# Patient Record
Sex: MALE | Race: WHITE | NOT HISPANIC OR LATINO | URBAN - METROPOLITAN AREA
[De-identification: names, ages, dates, MRNs, and addresses within clinical notes are randomized per-mention and may not be internally consistent; named-entity substitution may affect disease eponyms.]

---

## 2022-08-31 ENCOUNTER — WEB ENCOUNTER (OUTPATIENT)
Dept: URBAN - METROPOLITAN AREA CLINIC 107 | Facility: CLINIC | Age: 52
End: 2022-08-31

## 2022-08-31 ENCOUNTER — TELEPHONE ENCOUNTER (OUTPATIENT)
Dept: URBAN - METROPOLITAN AREA CLINIC 113 | Facility: CLINIC | Age: 52
End: 2022-08-31

## 2022-08-31 ENCOUNTER — OFFICE VISIT (OUTPATIENT)
Dept: URBAN - METROPOLITAN AREA CLINIC 107 | Facility: CLINIC | Age: 52
End: 2022-08-31
Payer: MEDICARE

## 2022-08-31 VITALS
TEMPERATURE: 98.2 F | SYSTOLIC BLOOD PRESSURE: 146 MMHG | DIASTOLIC BLOOD PRESSURE: 102 MMHG | HEART RATE: 84 BPM | HEIGHT: 68 IN | BODY MASS INDEX: 41.83 KG/M2 | RESPIRATION RATE: 20 BRPM | WEIGHT: 276 LBS

## 2022-08-31 DIAGNOSIS — R10.84 GENERALIZED ABDOMINAL PAIN: ICD-10-CM

## 2022-08-31 PROCEDURE — 99204 OFFICE O/P NEW MOD 45 MIN: CPT | Performed by: INTERNAL MEDICINE

## 2022-08-31 RX ORDER — DULOXETINE 60 MG/1
1 CAPSULE CAPSULE, DELAYED RELEASE PELLETS ORAL ONCE A DAY
Status: ACTIVE | COMMUNITY

## 2022-08-31 RX ORDER — CELECOXIB 200 MG/1
1 CAPSULE WITH FOOD CAPSULE ORAL ONCE A DAY
Status: ACTIVE | COMMUNITY

## 2022-08-31 RX ORDER — TRAZODONE HYDROCHLORIDE 100 MG/1
1 TABLET AT BEDTIME TABLET ORAL ONCE A DAY
Status: ACTIVE | COMMUNITY

## 2022-08-31 RX ORDER — HYOSCYAMINE SULFATE 0.12 MG/1
1-2 TABLET TABLET SUBLINGUAL
Qty: 60 | Refills: 3 | OUTPATIENT
Start: 2022-08-31 | End: 2022-12-28

## 2022-08-31 RX ORDER — HYDROXYZINE HYDROCHLORIDE 25 MG/1
1 TABLET AT BEDTIME AS NEEDED TABLET, FILM COATED ORAL ONCE A DAY
Status: ACTIVE | COMMUNITY

## 2022-08-31 NOTE — HPI-TODAY'S VISIT:
Mr. Dugan is a 52-year-old gentleman with a history of PTSD and appendectomy presenting for evaluation of abdominal pain.  He reports for more than 1 year having intermittent episodes of abdominal pain that varies in location, described as a "twisting" pain.  The pain occurs daily, generally last anywhere from 2 to 30 minutes, then self resolves.   No radiation or obvious modifying factors.  He was seen by Dr. Phillips and underwent an appendectomy.  He has occasional nausea and nonbloody emesis with the abdominal pain.  His bowel habits are regular.  No NSAID use.  No fevers, chills, melena or red blood per rectum.  He has occasional heartburn and regurgitation at night, but no dysphagia.  No records are available for review, but he has undergone a CT abdomen pelvis before undergoing an appendectomy.  He had a colonoscopy in 2021 that was unremarkable.  He has never undergone an EGD.  He is adopted and does not know his parents medical history.  Labs rox for review from 7/28/2022 showed normal CBC, normal basic metabolic panel, normal liver function tests, TSH 1.78, mildly elevated lipase 92 (upper limit normal 60), vitamin D 25-hydroxy 21.7.

## 2022-10-17 ENCOUNTER — LAB OUTSIDE AN ENCOUNTER (OUTPATIENT)
Dept: URBAN - METROPOLITAN AREA CLINIC 107 | Facility: CLINIC | Age: 52
End: 2022-10-17

## 2022-10-17 ENCOUNTER — OFFICE VISIT (OUTPATIENT)
Dept: URBAN - METROPOLITAN AREA CLINIC 107 | Facility: CLINIC | Age: 52
End: 2022-10-17
Payer: MEDICARE

## 2022-10-17 VITALS
HEART RATE: 87 BPM | DIASTOLIC BLOOD PRESSURE: 95 MMHG | WEIGHT: 279 LBS | HEIGHT: 68 IN | RESPIRATION RATE: 20 BRPM | BODY MASS INDEX: 42.28 KG/M2 | TEMPERATURE: 98.2 F | SYSTOLIC BLOOD PRESSURE: 146 MMHG

## 2022-10-17 DIAGNOSIS — K21.9 GASTROESOPHAGEAL REFLUX DISEASE, UNSPECIFIED WHETHER ESOPHAGITIS PRESENT: ICD-10-CM

## 2022-10-17 DIAGNOSIS — R10.10 PAIN OF UPPER ABDOMEN: ICD-10-CM

## 2022-10-17 PROCEDURE — 99214 OFFICE O/P EST MOD 30 MIN: CPT | Performed by: INTERNAL MEDICINE

## 2022-10-17 RX ORDER — CELECOXIB 200 MG/1
1 CAPSULE WITH FOOD CAPSULE ORAL ONCE A DAY
Status: ACTIVE | COMMUNITY

## 2022-10-17 RX ORDER — TRAZODONE HYDROCHLORIDE 100 MG/1
1 TABLET AT BEDTIME TABLET ORAL ONCE A DAY
Status: ACTIVE | COMMUNITY

## 2022-10-17 RX ORDER — HYDROXYZINE HYDROCHLORIDE 25 MG/1
1 TABLET AT BEDTIME AS NEEDED TABLET, FILM COATED ORAL ONCE A DAY
Status: ACTIVE | COMMUNITY

## 2022-10-17 RX ORDER — HYOSCYAMINE SULFATE 0.12 MG/1
1-2 TABLET TABLET SUBLINGUAL
Qty: 60 | Refills: 3 | Status: ACTIVE | COMMUNITY
Start: 2022-08-31 | End: 2022-12-28

## 2022-10-17 RX ORDER — DULOXETINE 60 MG/1
1 CAPSULE CAPSULE, DELAYED RELEASE PELLETS ORAL ONCE A DAY
Status: ACTIVE | COMMUNITY

## 2022-10-17 NOTE — HPI-TODAY'S VISIT:
Mr. Dugan is a 52-year-old gentleman with a history of PTSD and appendectomy presenting for evaluation of abdominal pain.  He was last seen on 8/31/2022 for evaluation of generalized abdominal pain that wax and wane with no modifying factors.  Labs provided for review were unremarkable.  A CT abdomen pelvis with contrast was ordered, and he was provided hyoscyamine for relief of exacerbations of pain.  The CT abdomen pelvis with contrast on 9/22/2022 showed no acute findings.  He reports that the hyoscyamine has not been helpful for relieving pain.  He continues to have waxing and waning diffuse abdominal pain that is largely worse in the mid upper abdomen.  He denies any modifying factors such as diet or bowel movements.  He typically has the episodes of abdominal pain 1 to 2 days/week.  The episodes generally last anywhere from 2 to 30 minutes.  He reports that his bowel habits are occasionally somewhat loose consistency but overall fairly normal with no melena or red blood per rectum.  He denies any weight loss.  He has occasional episodes of heartburn that has not improved with a proton pump inhibitor.  He denies any dysphagia.  He reportedly had a colonoscopy by a local surgeon that was unremarkable.  He had a EGD more than 10 years ago for evaluation of GERD symptoms that was "okay".  The records of his EGD and colonoscopy are not available for review.

## 2022-10-21 ENCOUNTER — OFFICE VISIT (OUTPATIENT)
Dept: URBAN - METROPOLITAN AREA SURGERY CENTER 25 | Facility: SURGERY CENTER | Age: 52
End: 2022-10-21

## 2022-10-21 ENCOUNTER — CLAIMS CREATED FROM THE CLAIM WINDOW (OUTPATIENT)
Dept: URBAN - METROPOLITAN AREA SURGERY CENTER 25 | Facility: SURGERY CENTER | Age: 52
End: 2022-10-21
Payer: MEDICARE

## 2022-10-21 ENCOUNTER — CLAIMS CREATED FROM THE CLAIM WINDOW (OUTPATIENT)
Dept: URBAN - METROPOLITAN AREA CLINIC 4 | Facility: CLINIC | Age: 52
End: 2022-10-21
Payer: MEDICARE

## 2022-10-21 DIAGNOSIS — K22.89 OTHER SPECIFIED DISEASE OF ESOPHAGUS: ICD-10-CM

## 2022-10-21 DIAGNOSIS — K31.7 POLYP OF STOMACH FUNDIC GLAND: ICD-10-CM

## 2022-10-21 DIAGNOSIS — K31.89 OTHER DISEASES OF STOMACH AND DUODENUM: ICD-10-CM

## 2022-10-21 PROBLEM — 83132003: Status: ACTIVE | Noted: 2022-10-17

## 2022-10-21 PROCEDURE — 88312 SPECIAL STAINS GROUP 1: CPT | Performed by: PATHOLOGY

## 2022-10-21 PROCEDURE — 88305 TISSUE EXAM BY PATHOLOGIST: CPT | Performed by: PATHOLOGY

## 2022-10-21 PROCEDURE — 43239 EGD BIOPSY SINGLE/MULTIPLE: CPT | Performed by: INTERNAL MEDICINE

## 2022-10-21 PROCEDURE — G8907 PT DOC NO EVENTS ON DISCHARG: HCPCS | Performed by: INTERNAL MEDICINE

## 2022-10-21 RX ORDER — HYOSCYAMINE SULFATE 0.12 MG/1
1-2 TABLET TABLET SUBLINGUAL
Qty: 60 | Refills: 3 | Status: ACTIVE | COMMUNITY
Start: 2022-08-31 | End: 2022-12-28

## 2022-10-21 RX ORDER — DULOXETINE 60 MG/1
1 CAPSULE CAPSULE, DELAYED RELEASE PELLETS ORAL ONCE A DAY
Status: ACTIVE | COMMUNITY

## 2022-10-21 RX ORDER — CELECOXIB 200 MG/1
1 CAPSULE WITH FOOD CAPSULE ORAL ONCE A DAY
Status: ACTIVE | COMMUNITY

## 2022-10-21 RX ORDER — TRAZODONE HYDROCHLORIDE 100 MG/1
1 TABLET AT BEDTIME TABLET ORAL ONCE A DAY
Status: ACTIVE | COMMUNITY

## 2022-10-21 RX ORDER — HYDROXYZINE HYDROCHLORIDE 25 MG/1
1 TABLET AT BEDTIME AS NEEDED TABLET, FILM COATED ORAL ONCE A DAY
Status: ACTIVE | COMMUNITY

## 2022-11-14 PROBLEM — 92411005 BENIGN NEOPLASM OF STOMACH: Status: ACTIVE | Noted: 2022-11-14

## 2022-12-29 ENCOUNTER — OFFICE VISIT (OUTPATIENT)
Dept: URBAN - METROPOLITAN AREA CLINIC 113 | Facility: CLINIC | Age: 52
End: 2022-12-29
Payer: MEDICARE

## 2022-12-29 VITALS
BODY MASS INDEX: 42.13 KG/M2 | WEIGHT: 278 LBS | RESPIRATION RATE: 16 BRPM | DIASTOLIC BLOOD PRESSURE: 90 MMHG | SYSTOLIC BLOOD PRESSURE: 139 MMHG | TEMPERATURE: 97.8 F | HEART RATE: 88 BPM | HEIGHT: 68 IN

## 2022-12-29 DIAGNOSIS — K21.9 GERD: ICD-10-CM

## 2022-12-29 DIAGNOSIS — R10.84 GENERALIZED ABDOMINAL PAIN: ICD-10-CM

## 2022-12-29 DIAGNOSIS — R10.11 RIGHT UPPER QUADRANT ABDOMINAL PAIN: ICD-10-CM

## 2022-12-29 DIAGNOSIS — R14.0 ABDOMINAL BLOATING: ICD-10-CM

## 2022-12-29 DIAGNOSIS — R19.7 DIARRHEA: ICD-10-CM

## 2022-12-29 PROCEDURE — 99214 OFFICE O/P EST MOD 30 MIN: CPT | Performed by: NURSE PRACTITIONER

## 2022-12-29 RX ORDER — CELECOXIB 200 MG/1
1 CAPSULE WITH FOOD CAPSULE ORAL ONCE A DAY
Status: ACTIVE | COMMUNITY

## 2022-12-29 RX ORDER — HYDROXYZINE HYDROCHLORIDE 25 MG/1
1 TABLET AT BEDTIME AS NEEDED TABLET, FILM COATED ORAL ONCE A DAY
Status: ACTIVE | COMMUNITY

## 2022-12-29 RX ORDER — TRAZODONE HYDROCHLORIDE 100 MG/1
1 TABLET AT BEDTIME TABLET ORAL ONCE A DAY
Status: ACTIVE | COMMUNITY

## 2022-12-29 RX ORDER — DULOXETINE 60 MG/1
1 CAPSULE CAPSULE, DELAYED RELEASE PELLETS ORAL ONCE A DAY
Status: ACTIVE | COMMUNITY

## 2022-12-29 NOTE — HPI-TODAY'S VISIT:
Mr. Dugan is a 52-year-old gentleman with a history of PTSD and appendectomy presenting for follow-up of abdominal pain. He was last seen 10/17/22 for follow-up of unexplained upper abdominal pain, unresponsive to PPI. Previous labs and CT of the abdomen and pelvis with contrast were unremarkable. An upper endoscopy was planned, with consider for HIDA scan to evaluate for chronic cholecystitis pending clinical course. The EGD was performed on 10/21/22. Findings included an irregular z-line, nonerosive gastropathy, and multiple gastric polyps s/p biopsies which showed fundic gland polyps. Gastric biopsies were negative for H. pylori. His symptoms have worsened within the last week. He complains of diffuse abdominal pain, which is worsened on the right side. The pain is constant but worsened with positional changes such as bending over. The abdominal discomfort is not related to eating or bowel movements, but is cramping in nature. He complains of associated nausea with dry heaves but has not vomited. He complains of intermittent heartburn and a nighttime cough with regurgitation when he lies down. He is taking ?Nexium once daily. His stools are always soft, but in the last week he has experienced an exacerbation of diarrhea. He is having numerous loose stools per day with associated nighttime bowel movements and excess gas. He experiences bloating following meals. This has led to a decrease in appetite, but he denies early satiety. His weight is stable. He was seen in the ER at [facility] earlier this week and reports negative labs and CT. He was given prescriptions for dicyclomine and promethazine which have not been helpful. A previous trial of hyoscyamine was also ineffective. He denies new medications or recent antibiotic use. He reports a normal colonoscopy within the last year. His wife accompanies him and states his worsening abdominal symptoms are beginning to interfere with his quality of life.

## 2022-12-29 NOTE — HPI-OTHER HISTORIES
CT abdomen pelvis with contrast on 9/22/2022 showed no acute findings.    He reportedly had a colonoscopy by a local surgeon that was unremarkable.  He had a EGD more than 10 years ago for evaluation of GERD symptoms that was "okay".  The records of his EGD and colonoscopy are not available for review.

## 2023-03-06 ENCOUNTER — TELEPHONE ENCOUNTER (OUTPATIENT)
Dept: URBAN - METROPOLITAN AREA CLINIC 113 | Facility: CLINIC | Age: 53
End: 2023-03-06

## 2023-04-07 ENCOUNTER — DASHBOARD ENCOUNTERS (OUTPATIENT)
Age: 53
End: 2023-04-07

## 2023-04-07 ENCOUNTER — OFFICE VISIT (OUTPATIENT)
Dept: URBAN - METROPOLITAN AREA CLINIC 113 | Facility: CLINIC | Age: 53
End: 2023-04-07
Payer: MEDICARE

## 2023-04-07 VITALS
SYSTOLIC BLOOD PRESSURE: 142 MMHG | RESPIRATION RATE: 16 BRPM | BODY MASS INDEX: 42.59 KG/M2 | WEIGHT: 281 LBS | HEIGHT: 68 IN | HEART RATE: 87 BPM | DIASTOLIC BLOOD PRESSURE: 92 MMHG | TEMPERATURE: 97.5 F

## 2023-04-07 DIAGNOSIS — R13.19 ESOPHAGEAL DYSPHAGIA: ICD-10-CM

## 2023-04-07 DIAGNOSIS — K21.9 GERD WITHOUT ESOPHAGITIS: ICD-10-CM

## 2023-04-07 DIAGNOSIS — R10.84 GENERALIZED ABDOMINAL PAIN: ICD-10-CM

## 2023-04-07 PROCEDURE — 99213 OFFICE O/P EST LOW 20 MIN: CPT | Performed by: INTERNAL MEDICINE

## 2023-04-07 RX ORDER — TRAZODONE HYDROCHLORIDE 100 MG/1
1 TABLET AT BEDTIME TABLET ORAL ONCE A DAY
Status: ACTIVE | COMMUNITY

## 2023-04-07 RX ORDER — CELECOXIB 200 MG/1
1 CAPSULE WITH FOOD CAPSULE ORAL ONCE A DAY
Status: ACTIVE | COMMUNITY

## 2023-04-07 RX ORDER — HYDROXYZINE HYDROCHLORIDE 25 MG/1
1 TABLET AT BEDTIME AS NEEDED TABLET, FILM COATED ORAL ONCE A DAY
Status: ACTIVE | COMMUNITY

## 2023-04-07 RX ORDER — DULOXETINE 60 MG/1
1 CAPSULE CAPSULE, DELAYED RELEASE PELLETS ORAL ONCE A DAY
Status: ACTIVE | COMMUNITY

## 2023-04-07 RX ORDER — OMEPRAZOLE 40 MG/1
1 CAPSULE 30 MINUTES BEFORE MORNING MEAL CAPSULE, DELAYED RELEASE ORAL ONCE A DAY
Qty: 30 | Refills: 5 | OUTPATIENT
Start: 2023-04-07

## 2023-04-07 NOTE — HPI-TODAY'S VISIT:
Mr. Dugan is a 52-year-old gentleman with a history of PTSD and appendectomy presenting for follow-up of abdominal pain.  He was last seen on 12/29/2022 for follow-up after undergoing EGD for evaluation of abdominal pain, diarrhea and GERD.  He was recommended to undergo a HIDA scan to further evaluate his reported right upper quadrant abdominal pain, continue PPI for GERD symptoms, and complete stool studies for evaluation of diarrhea.  The HIDA scan was ordered at [facility], but it apparently was not completed.  He continues to have episodes of upper and mid abdominal cramping abdominal pain that waxes and wanes.  He reports starting exercise but the exercises seem to exacerbate his abdominal pain.  He denies any nausea or vomiting.  His weight has been stable.  His heartburn is controlled on omeprazole 40 mg daily.  No dysphagia.  He denies any significant heartburn but reports some difficultly swallowing solids mor than liquids.  No pain with swallowing.  No melena or red blood per rectum.  His previous stool studies from 12/29/2022 were not completed.  Labs from Jonathan Kiff, PA-C on 3/1/2023 is notable for normal basic metabolic panel, normal liver enzymes, normal CBC, normal lipase.

## 2023-04-07 NOTE — HPI-OTHER HISTORIES
EGD (10/21/22): indings included an irregular z-line, nonerosive gastropathy, and multiple gastric polyps s/p biopsies which showed fundic gland polyps. Gastric biopsies were negative for H. pylori.  CT abdomen pelvis with contrast on 9/22/2022 showed no acute findings.    He reportedly had a colonoscopy by a local surgeon that was unremarkable.  He had a EGD more than 10 years ago for evaluation of GERD symptoms that was "okay".  The records of his EGD and colonoscopy are not available for review.

## 2023-04-21 ENCOUNTER — TELEPHONE ENCOUNTER (OUTPATIENT)
Dept: URBAN - METROPOLITAN AREA CLINIC 113 | Facility: CLINIC | Age: 53
End: 2023-04-21

## 2023-04-26 PROBLEM — 266435005: Status: ACTIVE | Noted: 2023-04-26

## 2023-07-12 ENCOUNTER — WEB ENCOUNTER (OUTPATIENT)
Dept: URBAN - METROPOLITAN AREA CLINIC 113 | Facility: CLINIC | Age: 53
End: 2023-07-12

## 2023-07-12 ENCOUNTER — OFFICE VISIT (OUTPATIENT)
Dept: URBAN - METROPOLITAN AREA CLINIC 113 | Facility: CLINIC | Age: 53
End: 2023-07-12